# Patient Record
Sex: FEMALE | Race: WHITE | Employment: FULL TIME | URBAN - METROPOLITAN AREA
[De-identification: names, ages, dates, MRNs, and addresses within clinical notes are randomized per-mention and may not be internally consistent; named-entity substitution may affect disease eponyms.]

---

## 2019-01-11 ENCOUNTER — OFFICE VISIT (OUTPATIENT)
Dept: CARDIOLOGY CLINIC | Age: 49
End: 2019-01-11

## 2019-01-11 VITALS — OXYGEN SATURATION: 97 % | BODY MASS INDEX: 24.01 KG/M2 | WEIGHT: 153 LBS | HEIGHT: 67 IN

## 2019-01-11 DIAGNOSIS — I49.3 SYMPTOMATIC PVCS: ICD-10-CM

## 2019-01-11 DIAGNOSIS — I45.10 RBBB: ICD-10-CM

## 2019-01-11 DIAGNOSIS — R00.2 HEART PALPITATIONS: Primary | ICD-10-CM

## 2019-01-11 RX ORDER — OMEPRAZOLE 40 MG/1
40 CAPSULE, DELAYED RELEASE ORAL DAILY
COMMUNITY

## 2019-01-11 NOTE — PROGRESS NOTES
Debbie Aguilera presents today for Chief Complaint Patient presents with  Chest Pain  
  follow up per PCP for palps  Palpitations  
  racing Debbie Aguilera preferred language for health care discussion is english/other. Is someone accompanying this pt? No  
 
Is the patient using any DME equipment during OV? No  
 
Depression Screening: No flowsheet data found. Learning Assessment: 
Learning Assessment 8/5/2016 PRIMARY LEARNER Patient PRIMARY LANGUAGE ENGLISH  
LEARNER PREFERENCE PRIMARY DEMONSTRATION  
ANSWERED BY Patient RELATIONSHIP SELF Abuse Screening: No flowsheet data found. Fall Risk No flowsheet data found. Pt currently taking Anticoagulant therapy? No  
 
Coordination of Care: 1. Have you been to the ER, urgent care clinic since your last visit? Hospitalized since your last visit? No  
 
2. Have you seen or consulted any other health care providers outside of the [address] [place] [address] since your last visit? Include any pap smears or colon screening.  No

## 2019-01-11 NOTE — PROGRESS NOTES
HISTORY OF PRESENT ILLNESS Layman Josh is a 51 y.o. female. Palpitations Pertinent negatives include no fever, no chest pain, no claudication, no orthopnea, no PND, no abdominal pain, no nausea, no vomiting, no headaches, no dizziness, no cough and no shortness of breath. Patient presents for an add-on office visit. She has a past medical history significant for PVCs, which had been frequent in the past and were diagnosed on a Holter monitor in 2011. She also was found to have frequent episodes of ventricular bigeminy and trigeminy. Patient underwent an echocardiogram back in 2016 which was a normal study, EF 65% without any valvular heart disease. She was last seen in our office a little over 2 years ago. She returns today complaining of increased heart palpitations over the past 3 months. She states that this feels different from her PVCs which felt more like a skipped heartbeat in the past but it is more of a racing heart and tends to occur when she is anxious or stressed out. She states that she recently underwent a 14-day event monitor through her PCPs office last month which demonstrated sinus tachycardia which correlated to her heart palpitations. She was not told that she had any specific arrhythmias. She states she did quit smoking and quit drinking caffeine 2 weeks ago and feels that her symptoms have somewhat improved. She feels that as long as her anxiety is under control, she does not experience any heart racing. Past Medical History:  
Diagnosis Date  Anxiety  Cardiac Holter monitor 06/24/2011 Cardiolabs:  Normal sinus rhythm, avg HR 85 bpm (range  bpm). Freq ventricular ectopic beats w/rare bigeminy & trigeminy. No heart block or pauses > 2.0 secs. Rare 2nd-degree Type 1 AV block.  GERD (gastroesophageal reflux disease)  Heart palpitations  Migraine  Tobacco use disorder Current Outpatient Medications Medication Sig Dispense Refill  omeprazole (PRILOSEC) 40 mg capsule Take 40 mg by mouth daily.  eletriptan (RELPAX) 20 mg tablet Take 20 mg by mouth once as needed for Migraine. may repeat in 2 hours if necessary  ALPRAZolam (XANAX) 1 mg tablet Take 0.5 mg by mouth nightly as needed for Anxiety. No Known Allergies Social History Tobacco Use  Smoking status: Current Every Day Smoker Packs/day: 1.00 Years: 20.00 Pack years: 20.00 Substance Use Topics  Alcohol use: Yes Alcohol/week: 0.5 oz Types: 1 Cans of beer per week Comment: social drinker 1 every couple of days  Drug use: Not on file Family History Problem Relation Age of Onset  Heart Attack Mother  Hypertension Father Review of Systems Constitutional: Negative for chills, fever and weight loss. HENT: Negative for nosebleeds. Eyes: Negative for blurred vision and double vision. Respiratory: Negative for cough, shortness of breath and wheezing. Cardiovascular: Positive for palpitations. Negative for chest pain, orthopnea, claudication, leg swelling and PND. Gastrointestinal: Negative for abdominal pain, heartburn, nausea and vomiting. Genitourinary: Negative for dysuria and hematuria. Musculoskeletal: Negative for falls and myalgias. Skin: Negative for rash. Neurological: Negative for dizziness, focal weakness and headaches. Endo/Heme/Allergies: Does not bruise/bleed easily. Psychiatric/Behavioral: Negative for substance abuse. The patient is nervous/anxious. Visit Vitals Ht 5' 7\" (1.702 m) Wt 69.4 kg (153 lb) SpO2 97% BMI 23.96 kg/m² Physical Exam  
Constitutional: She is oriented to person, place, and time. She appears well-developed and well-nourished. HENT:  
Head: Normocephalic and atraumatic. Eyes: Conjunctivae are normal.  
Neck: Neck supple. No JVD present. Carotid bruit is not present. Cardiovascular: Normal rate, regular rhythm, S1 normal, S2 normal and normal pulses. Occasional extrasystoles are present. Exam reveals no gallop. No murmur heard. Pulmonary/Chest: Effort normal and breath sounds normal. She has no wheezes. She has no rales. Abdominal: Soft. Bowel sounds are normal. There is no tenderness. Musculoskeletal: She exhibits no edema or tenderness. Neurological: She is alert and oriented to person, place, and time. Skin: Skin is warm and dry. Psychiatric: She has a normal mood and affect. Her behavior is normal.  
 
EKG: Normal sinus rhythm, normal axis, right bundle branch block, no ST or T wave abnormalities concerning for ischemia. Normal QTc interval.  Compared to the previous EKG, no significant interval change. ASSESSMENT and PLAN Palpitations. Patient reports experiencing different symptoms that are PVCs. I suspect she is simply noticing her sinus tachycardia which was documented on her event monitor by her PCP last month. This is usually secondary to an underlying issue such as anxiety. I would like to obtain a copy of her recent event monitor for us to review. If in fact this is only sinus tachycardia, no further workup is needed. Symptomatic PVCs. Patient reports that these have not been an issue recently. She did quit drinking caffeine again. Right bundle branch block. This is chronic and was present on her prior EKG in 2016. History of tobacco use disorder. Patient has a 20-pack-year smoking history. She quit smoking completely just 2 weeks ago. She was congratulated and encouraged to remain tobacco free. As long as her  event monitor from last month was normal, patient can follow-up as needed.

## 2019-01-11 NOTE — PATIENT INSTRUCTIONS
Follow up PRN. Will call you when we receive copy of your event monitor from your primary care doctor.

## 2019-01-21 ENCOUNTER — TELEPHONE (OUTPATIENT)
Dept: CARDIOLOGY CLINIC | Age: 49
End: 2019-01-21

## 2019-01-21 NOTE — TELEPHONE ENCOUNTER
Per Dr. Delma Jackson last note\"  Palpitations. Patient reports experiencing different symptoms that are PVCs. I suspect she is simply noticing her sinus tachycardia which was documented on her event monitor by her PCP last month. This is usually secondary to an underlying issue such as anxiety. I would like to obtain a copy of her recent event monitor for us to review. If in fact this is only sinus tachycardia, no further workup is needed. 
  
Symptomatic PVCs. Patient reports that these have not been an issue recently. She did quit drinking caffeine again. Received patient even monitor from PCP, reviewed with Dr. Delma Jackson. Verbal order and read back per Maryam Fields MD  
Monitor showed frequent PVC's, with sinus tachycardia. Make patient aware. Patient may follow up as needed. This has been fully explained to the patient, who indicates understanding.

## 2020-08-26 ENCOUNTER — HOSPITAL ENCOUNTER (OUTPATIENT)
Dept: MAMMOGRAPHY | Age: 50
Discharge: HOME | End: 2020-08-26
Attending: ADVANCED PRACTICE MIDWIFE
Payer: OTHER GOVERNMENT

## 2020-08-26 DIAGNOSIS — Z12.31 VISIT FOR SCREENING MAMMOGRAM: ICD-10-CM

## 2020-08-26 PROCEDURE — 77063 BREAST TOMOSYNTHESIS BI: CPT

## 2022-03-18 PROBLEM — I45.10 RBBB: Status: ACTIVE | Noted: 2019-01-11

## 2025-06-23 ENCOUNTER — TRANSCRIBE ORDERS (OUTPATIENT)
Facility: HOSPITAL | Age: 55
End: 2025-06-23

## 2025-06-23 DIAGNOSIS — Z82.49 FAMILY HISTORY OF ISCHEMIC HEART DISEASE: Primary | ICD-10-CM

## 2025-08-26 ENCOUNTER — HOSPITAL ENCOUNTER (OUTPATIENT)
Facility: HOSPITAL | Age: 55
Discharge: HOME | End: 2025-08-28
Attending: FAMILY MEDICINE
Payer: COMMERCIAL

## 2025-08-26 VITALS
BODY MASS INDEX: 26.68 KG/M2 | WEIGHT: 170 LBS | DIASTOLIC BLOOD PRESSURE: 80 MMHG | HEIGHT: 67 IN | SYSTOLIC BLOOD PRESSURE: 111 MMHG

## 2025-08-26 DIAGNOSIS — Z82.49 FAMILY HISTORY OF ISCHEMIC HEART DISEASE: ICD-10-CM

## 2025-08-26 LAB
ECHO AO ASC DIAM: 2.9 CM
ECHO AO ASCENDING AORTA INDEX: 1.53 CM/M2
ECHO AO ROOT DIAM: 2.7 CM
ECHO AO ROOT INDEX: 1.43 CM/M2
ECHO AV AREA PEAK VELOCITY: 1.9 CM2
ECHO AV AREA VTI: 1.9 CM2
ECHO AV AREA/BSA PEAK VELOCITY: 1 CM2/M2
ECHO AV AREA/BSA VTI: 1 CM2/M2
ECHO AV MEAN GRADIENT: 4 MMHG
ECHO AV MEAN VELOCITY: 1 M/S
ECHO AV PEAK GRADIENT: 9 MMHG
ECHO AV PEAK VELOCITY: 1.5 M/S
ECHO AV VELOCITY RATIO: 0.73
ECHO AV VTI: 32.1 CM
ECHO BSA: 1.91 M2
ECHO EST RA PRESSURE: 3 MMHG
ECHO LA VOL A-L A2C: 29 ML (ref 22–52)
ECHO LA VOL A-L A4C: 23 ML (ref 22–52)
ECHO LA VOL BP: 24 ML (ref 22–52)
ECHO LA VOL MOD A2C: 27 ML (ref 22–52)
ECHO LA VOL MOD A4C: 20 ML (ref 22–52)
ECHO LA VOL/BSA BIPLANE: 13 ML/M2 (ref 16–34)
ECHO LA VOLUME AREA LENGTH: 26 ML
ECHO LA VOLUME INDEX A-L A2C: 15 ML/M2 (ref 16–34)
ECHO LA VOLUME INDEX A-L A4C: 12 ML/M2 (ref 16–34)
ECHO LA VOLUME INDEX AREA LENGTH: 14 ML/M2 (ref 16–34)
ECHO LA VOLUME INDEX MOD A2C: 14 ML/M2 (ref 16–34)
ECHO LA VOLUME INDEX MOD A4C: 11 ML/M2 (ref 16–34)
ECHO LV E' LATERAL VELOCITY: 10.95 CM/S
ECHO LV E' SEPTAL VELOCITY: 9.35 CM/S
ECHO LV EDV A2C: 79 ML
ECHO LV EDV A4C: 74 ML
ECHO LV EDV BP: 77 ML (ref 56–104)
ECHO LV EDV INDEX A4C: 39 ML/M2
ECHO LV EDV INDEX BP: 41 ML/M2
ECHO LV EDV NDEX A2C: 42 ML/M2
ECHO LV EF PHYSICIAN: 60 %
ECHO LV EJECTION FRACTION A2C: 66 %
ECHO LV EJECTION FRACTION A4C: 66 %
ECHO LV EJECTION FRACTION BIPLANE: 66 % (ref 55–100)
ECHO LV ESV A2C: 27 ML
ECHO LV ESV A4C: 25 ML
ECHO LV ESV BP: 26 ML (ref 19–49)
ECHO LV ESV INDEX A2C: 14 ML/M2
ECHO LV ESV INDEX A4C: 13 ML/M2
ECHO LV ESV INDEX BP: 14 ML/M2
ECHO LV FRACTIONAL SHORTENING: 26 % (ref 28–44)
ECHO LV INTERNAL DIMENSION DIASTOLE INDEX: 2.22 CM/M2
ECHO LV INTERNAL DIMENSION DIASTOLIC: 4.2 CM (ref 3.9–5.3)
ECHO LV INTERNAL DIMENSION SYSTOLIC INDEX: 1.64 CM/M2
ECHO LV INTERNAL DIMENSION SYSTOLIC: 3.1 CM
ECHO LV IVSD: 0.7 CM (ref 0.6–0.9)
ECHO LV MASS 2D: 93 G (ref 67–162)
ECHO LV MASS INDEX 2D: 49.2 G/M2 (ref 43–95)
ECHO LV POSTERIOR WALL DIASTOLIC: 0.8 CM (ref 0.6–0.9)
ECHO LV RELATIVE WALL THICKNESS RATIO: 0.38
ECHO LVOT AREA: 2.8 CM2
ECHO LVOT AV VTI INDEX: 0.72
ECHO LVOT DIAM: 1.9 CM
ECHO LVOT MEAN GRADIENT: 2 MMHG
ECHO LVOT PEAK GRADIENT: 5 MMHG
ECHO LVOT PEAK VELOCITY: 1.1 M/S
ECHO LVOT STROKE VOLUME INDEX: 34.5 ML/M2
ECHO LVOT SV: 65.2 ML
ECHO LVOT VTI: 23 CM
ECHO MV A VELOCITY: 0.57 M/S
ECHO MV AREA VTI: 2.4 CM2
ECHO MV E DECELERATION TIME (DT): 233.9 MS
ECHO MV E VELOCITY: 0.89 M/S
ECHO MV E/A RATIO: 1.56
ECHO MV E/E' LATERAL: 8.13
ECHO MV E/E' RATIO (AVERAGED): 8.82
ECHO MV E/E' SEPTAL: 9.52
ECHO MV LVOT VTI INDEX: 1.17
ECHO MV MAX VELOCITY: 0.9 M/S
ECHO MV MEAN GRADIENT: 1 MMHG
ECHO MV MEAN VELOCITY: 0.5 M/S
ECHO MV PEAK GRADIENT: 3 MMHG
ECHO MV VTI: 26.8 CM
ECHO PV MAX VELOCITY: 1.1 M/S
ECHO PV PEAK GRADIENT: 5 MMHG
ECHO RA END SYSTOLIC VOLUME APICAL 4 CHAMBER INDEX BSA: 11 ML/M2
ECHO RA VOLUME AREA LENGTH APICAL 4 CHAMBER: 22 ML
ECHO RA VOLUME: 21 ML
ECHO RV BASAL DIMENSION: 3 CM
ECHO RV FREE WALL PEAK S': 9.7 CM/S
ECHO RV TAPSE: 1.8 CM (ref 1.7–?)
ECHO RVOT PEAK GRADIENT: 4 MMHG
ECHO RVOT PEAK VELOCITY: 1 M/S

## 2025-08-26 PROCEDURE — 93306 TTE W/DOPPLER COMPLETE: CPT

## 2025-08-26 PROCEDURE — 93306 TTE W/DOPPLER COMPLETE: CPT | Performed by: INTERNAL MEDICINE
